# Patient Record
Sex: MALE | Race: WHITE | NOT HISPANIC OR LATINO | Employment: FULL TIME | URBAN - METROPOLITAN AREA
[De-identification: names, ages, dates, MRNs, and addresses within clinical notes are randomized per-mention and may not be internally consistent; named-entity substitution may affect disease eponyms.]

---

## 2024-02-15 DIAGNOSIS — L02.91 ABSCESS: Primary | ICD-10-CM

## 2024-02-15 RX ORDER — SULFAMETHOXAZOLE AND TRIMETHOPRIM 800; 160 MG/1; MG/1
1 TABLET ORAL 2 TIMES DAILY
Qty: 10 TABLET | Refills: 0 | Status: SHIPPED | OUTPATIENT
Start: 2024-02-15 | End: 2024-02-20

## 2024-02-15 NOTE — PROGRESS NOTES
Right axillary inflammation with small abscess noted. Plan for bactrim x 5 days. May need further drainage or excision if this persists

## 2025-02-12 ENCOUNTER — OFFICE VISIT (OUTPATIENT)
Dept: PAIN MEDICINE | Facility: CLINIC | Age: 40
End: 2025-02-12
Payer: COMMERCIAL

## 2025-02-12 VITALS — DIASTOLIC BLOOD PRESSURE: 64 MMHG | SYSTOLIC BLOOD PRESSURE: 96 MMHG | OXYGEN SATURATION: 95 % | HEART RATE: 77 BPM

## 2025-02-12 DIAGNOSIS — M46.1 SACROILIITIS, NOT ELSEWHERE CLASSIFIED (CMS-HCC): Primary | ICD-10-CM

## 2025-02-12 PROCEDURE — 99214 OFFICE O/P EST MOD 30 MIN: CPT | Performed by: ANESTHESIOLOGY

## 2025-02-12 PROCEDURE — 99204 OFFICE O/P NEW MOD 45 MIN: CPT | Performed by: ANESTHESIOLOGY

## 2025-02-12 PROCEDURE — G2211 COMPLEX E/M VISIT ADD ON: HCPCS | Performed by: ANESTHESIOLOGY

## 2025-02-12 ASSESSMENT — ENCOUNTER SYMPTOMS
LOSS OF SENSATION IN FEET: 0
DEPRESSION: 0
ENDOCRINE NEGATIVE: 1
CONSTITUTIONAL NEGATIVE: 1
BACK PAIN: 1
RESPIRATORY NEGATIVE: 1
OCCASIONAL FEELINGS OF UNSTEADINESS: 0
NEUROLOGICAL NEGATIVE: 1
CARDIOVASCULAR NEGATIVE: 1
EYES NEGATIVE: 1
GASTROINTESTINAL NEGATIVE: 1
HEMATOLOGIC/LYMPHATIC NEGATIVE: 1
PSYCHIATRIC NEGATIVE: 1

## 2025-02-12 ASSESSMENT — PAIN DESCRIPTION - DESCRIPTORS: DESCRIPTORS: SHARP

## 2025-02-12 ASSESSMENT — LIFESTYLE VARIABLES: TOTAL SCORE: 1

## 2025-02-12 ASSESSMENT — PATIENT HEALTH QUESTIONNAIRE - PHQ9
2. FEELING DOWN, DEPRESSED OR HOPELESS: NOT AT ALL
SUM OF ALL RESPONSES TO PHQ9 QUESTIONS 1 AND 2: 0
1. LITTLE INTEREST OR PLEASURE IN DOING THINGS: NOT AT ALL

## 2025-02-12 ASSESSMENT — PAIN - FUNCTIONAL ASSESSMENT: PAIN_FUNCTIONAL_ASSESSMENT: 0-10

## 2025-02-12 ASSESSMENT — PAIN SCALES - GENERAL
PAINLEVEL_OUTOF10: 7
PAINLEVEL_OUTOF10: 7

## 2025-02-12 NOTE — PROGRESS NOTES
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    Pt describes L buttock pain since September without inciting trauma/incident. Pain does not radiate. Pain is worse with sitting, but also noticeable when laying on firm mattress. Pt has tried avoiding sitting on hard surfaces, but has not experienced sustained relief.     Pt denies new-onset numbness, weakness, bowel/bladder incontinence.  Pt denies recent infection, allergy to Latex/iodine/contrast. Patient is currently taking the following blood thinner(s): N/A    REVIEW OF SYSTEMS  Review of Systems   Constitutional: Negative.    HENT: Negative.     Eyes: Negative.    Respiratory: Negative.     Cardiovascular: Negative.    Gastrointestinal: Negative.    Endocrine: Negative.    Musculoskeletal:  Positive for back pain.   Skin: Negative.    Neurological: Negative.    Hematological: Negative.    Psychiatric/Behavioral: Negative.         PAST MEDICAL HISTORY  History reviewed. No pertinent past medical history.  History reviewed. No pertinent surgical history.  No family history on file.    CURRENT MEDICATIONS  No current outpatient medications on file.     No current facility-administered medications for this visit.       ALLERGIES AND DRUG REACTIONS  No Known Allergies       OBJECTIVE  Visit Vitals  BP 96/64   Pulse 77   SpO2 95%   Smoking Status Never       Last Recorded Pain Score (if available):           Pain Score:   7     Physical Exam  Vitals and nursing note reviewed.     General: Sitting in chair, NAD  Head: NCAT  Eyes: Sclera/conjunctiva clear, EOMI, PERRL  Nose/mouth: MMM  CV: Good distal pulses  Lungs: Good/equal chest excursion  Abdomen: Soft, ND  Ext: No cyanosis/edema  MSK: L-spine alignment: unremarkable, BL  "paraspinal m NTTP except for left lower sacrum, L-spine ROM: full  L SIJ: +thigh thrust, [place], CHRISTY, Carla finger    Neuro: AAOx3, CN grossly nl, sensation grossly nl    Psych: affect nl  Skin: no rash/lesions      REVIEW OF LABORATORY DATA  I have reviewed the following lab results:  WBC   Date Value Ref Range Status   09/21/2019 9.8 4.5 - 11.0 K/UL Final     RBC   Date Value Ref Range Status   09/21/2019 4.40 (L) 4.5 - 5.5 M/UL Final     Hemoglobin   Date Value Ref Range Status   09/21/2019 13.2 (L) 13.5 - 16.5 GM/DL Final     Hematocrit   Date Value Ref Range Status   09/21/2019 39.3 (L) 41 - 50 % Final     MCV   Date Value Ref Range Status   09/21/2019 89.3 80 - 100 FL Final     MCH   Date Value Ref Range Status   09/21/2019 30.0 26 - 34 PG Final     MCHC   Date Value Ref Range Status   09/21/2019 33.6 31 - 37 % Final     Platelets   Date Value Ref Range Status   09/21/2019 471 (H) 150 - 450 K/UL Final     MPV   Date Value Ref Range Status   09/21/2019 9.3 7.0 - 12.6 CU Final     Sodium   Date Value Ref Range Status   09/21/2019 137 133 - 145 MMOL/L Final     Potassium   Date Value Ref Range Status   09/21/2019 4.0 3.4 - 5.1 MMOL/L Final     Bicarbonate   Date Value Ref Range Status   09/21/2019 25 24 - 31 MMOL/L Final     Urea Nitrogen   Date Value Ref Range Status   09/21/2019 13 8 - 25 MG/DL Final     Calcium   Date Value Ref Range Status   09/21/2019 9.6 8.5 - 10.4 MG/DL Final     No results found for: \"PROTIME\", \"PTT\", \"INR\", \"FIBRINOGEN\"      REVIEW OF RADIOLOGY   I have reviewed the following:  Radiology Studies           MR abdomen w/wo 12/26/25:  Stable study with no recurrence or abdominopelvic metastasis.       MR pelvis w/wo  12/26/25:   Stable study with no recurrence or abdominopelvic metastasis.       XR sacrum/coccyx 9/5/24:  1.  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    1) L buttock pain  -Likely SIJ-mediated as it is reproducible on multiple SIJ-provocative maneuvers on PE  -Refractive to >4 mo conservative tx including lifestyle/ergonomic modifications, rest  -Reviewed/discussed XR sacrum/coccyx 9/5/24: Mild sacroiliac joint osteoarthritis   -Reviewed/discussed MR pelvis w/wo  12/26/25:  Stable study with no recurrence or abdominopelvic metastasis.  -Reviewed/discussed MR abdomen w/wo 12/26/25: Stable study with no recurrence or abdominopelvic metastasis.   -Schedule L SIJ CSI to target pain generator as seen on imaging and minimize risk/likelihood of chronic opioid use and/or surgery  -In meantime, maximize conservative tx with referral to PT   -Consider ganglion impar block if refractive  -Consider MR sacrum/pelvis          Discussed procedure risks/benefits in detail with patient. Pt meets medical necessity for procedure due to failure of conservative measures. Reviewed procedural risks including bleeding, infection, nerve damage, paralysis. Also reviewed mitigating factors such as screening for infection/blood thinner use, sterile precautions, and image-guidance when applicable. All questions answered. Pt/guardian expressed understanding and choose to proceed    Today's visit involved establishment of care and a complete examination with review of records. In the context of the complexity of this patient's chronic pain diagnosis, long-term expectations and care planning discussed. Imaging studies ordered are placed do elucidate the patient's diagnosis, but also to evaluate the patient's candidacy for procedural and surgical interventions. The risks and benefits of these potential interventions are detailed as above.         Audrey Carlos MD  Anesthesiologist & Interventional Pain Physician   [facility] [place]  O: [phone]  F: [phone]  4:11 PM  02/12/25

## 2025-02-12 NOTE — H&P (VIEW-ONLY)
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    Pt describes L buttock pain since September without inciting trauma/incident. Pain does not radiate. Pain is worse with sitting, but also noticeable when laying on firm mattress. Pt has tried avoiding sitting on hard surfaces, but has not experienced sustained relief.     Pt denies new-onset numbness, weakness, bowel/bladder incontinence.  Pt denies recent infection, allergy to Latex/iodine/contrast. Patient is currently taking the following blood thinner(s): N/A    REVIEW OF SYSTEMS  Review of Systems   Constitutional: Negative.    HENT: Negative.     Eyes: Negative.    Respiratory: Negative.     Cardiovascular: Negative.    Gastrointestinal: Negative.    Endocrine: Negative.    Musculoskeletal:  Positive for back pain.   Skin: Negative.    Neurological: Negative.    Hematological: Negative.    Psychiatric/Behavioral: Negative.         PAST MEDICAL HISTORY  History reviewed. No pertinent past medical history.  History reviewed. No pertinent surgical history.  No family history on file.    CURRENT MEDICATIONS  No current outpatient medications on file.     No current facility-administered medications for this visit.       ALLERGIES AND DRUG REACTIONS  No Known Allergies       OBJECTIVE  Visit Vitals  BP 96/64   Pulse 77   SpO2 95%   Smoking Status Never       Last Recorded Pain Score (if available):           Pain Score:   7     Physical Exam  Vitals and nursing note reviewed.     General: Sitting in chair, NAD  Head: NCAT  Eyes: Sclera/conjunctiva clear, EOMI, PERRL  Nose/mouth: MMM  CV: Good distal pulses  Lungs: Good/equal chest excursion  Abdomen: Soft, ND  Ext: No cyanosis/edema  MSK: L-spine alignment: unremarkable, BL  "paraspinal m NTTP except for left lower sacrum, L-spine ROM: full  L SIJ: +thigh thrust, [place]len, CHRISTY, Carla finger    Neuro: AAOx3, CN grossly nl, sensation grossly nl    Psych: affect nl  Skin: no rash/lesions      REVIEW OF LABORATORY DATA  I have reviewed the following lab results:  WBC   Date Value Ref Range Status   09/21/2019 9.8 4.5 - 11.0 K/UL Final     RBC   Date Value Ref Range Status   09/21/2019 4.40 (L) 4.5 - 5.5 M/UL Final     Hemoglobin   Date Value Ref Range Status   09/21/2019 13.2 (L) 13.5 - 16.5 GM/DL Final     Hematocrit   Date Value Ref Range Status   09/21/2019 39.3 (L) 41 - 50 % Final     MCV   Date Value Ref Range Status   09/21/2019 89.3 80 - 100 FL Final     MCH   Date Value Ref Range Status   09/21/2019 30.0 26 - 34 PG Final     MCHC   Date Value Ref Range Status   09/21/2019 33.6 31 - 37 % Final     Platelets   Date Value Ref Range Status   09/21/2019 471 (H) 150 - 450 K/UL Final     MPV   Date Value Ref Range Status   09/21/2019 9.3 7.0 - 12.6 CU Final     Sodium   Date Value Ref Range Status   09/21/2019 137 133 - 145 MMOL/L Final     Potassium   Date Value Ref Range Status   09/21/2019 4.0 3.4 - 5.1 MMOL/L Final     Bicarbonate   Date Value Ref Range Status   09/21/2019 25 24 - 31 MMOL/L Final     Urea Nitrogen   Date Value Ref Range Status   09/21/2019 13 8 - 25 MG/DL Final     Calcium   Date Value Ref Range Status   09/21/2019 9.6 8.5 - 10.4 MG/DL Final     No results found for: \"PROTIME\", \"PTT\", \"INR\", \"FIBRINOGEN\"      REVIEW OF RADIOLOGY   I have reviewed the following:  Radiology Studies           MR abdomen w/wo 12/26/25:  Stable study with no recurrence or abdominopelvic metastasis.       MR pelvis w/wo  12/26/25:   Stable study with no recurrence or abdominopelvic metastasis.       XR sacrum/coccyx 9/5/24:  1.  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    1) L buttock pain  -Likely SIJ-mediated as it is reproducible on multiple SIJ-provocative maneuvers on PE  -Refractive to >4 mo conservative tx including lifestyle/ergonomic modifications, rest  -Reviewed/discussed XR sacrum/coccyx 9/5/24: Mild sacroiliac joint osteoarthritis   -Reviewed/discussed MR pelvis w/wo  12/26/25:  Stable study with no recurrence or abdominopelvic metastasis.  -Reviewed/discussed MR abdomen w/wo 12/26/25: Stable study with no recurrence or abdominopelvic metastasis.   -Schedule L SIJ CSI to target pain generator as seen on imaging and minimize risk/likelihood of chronic opioid use and/or surgery  -In meantime, maximize conservative tx with referral to PT   -Consider ganglion impar block if refractive  -Consider MR sacrum/pelvis          Discussed procedure risks/benefits in detail with patient. Pt meets medical necessity for procedure due to failure of conservative measures. Reviewed procedural risks including bleeding, infection, nerve damage, paralysis. Also reviewed mitigating factors such as screening for infection/blood thinner use, sterile precautions, and image-guidance when applicable. All questions answered. Pt/guardian expressed understanding and choose to proceed    Today's visit involved establishment of care and a complete examination with review of records. In the context of the complexity of this patient's chronic pain diagnosis, long-term expectations and care planning discussed. Imaging studies ordered are placed do elucidate the patient's diagnosis, but also to evaluate the patient's candidacy for procedural and surgical interventions. The risks and benefits of these potential interventions are detailed as above.         Audrey Carlos MD  Anesthesiologist & Interventional Pain Physician   [facility] [place]  O: [phone]  F: [phone]  4:11 PM  02/12/25

## 2025-03-06 ENCOUNTER — HOSPITAL ENCOUNTER (OUTPATIENT)
Dept: GASTROENTEROLOGY | Facility: HOSPITAL | Age: 40
Discharge: HOME | End: 2025-03-06
Payer: COMMERCIAL

## 2025-03-06 VITALS
OXYGEN SATURATION: 100 % | HEIGHT: 71 IN | RESPIRATION RATE: 17 BRPM | TEMPERATURE: 97.3 F | DIASTOLIC BLOOD PRESSURE: 76 MMHG | BODY MASS INDEX: 28 KG/M2 | HEART RATE: 71 BPM | WEIGHT: 200 LBS | SYSTOLIC BLOOD PRESSURE: 122 MMHG

## 2025-03-06 DIAGNOSIS — M46.1 SACROILIITIS, NOT ELSEWHERE CLASSIFIED (CMS-HCC): ICD-10-CM

## 2025-03-06 PROCEDURE — 2500000004 HC RX 250 GENERAL PHARMACY W/ HCPCS (ALT 636 FOR OP/ED): Mod: JZ | Performed by: ANESTHESIOLOGY

## 2025-03-06 PROCEDURE — 27096 INJECT SACROILIAC JOINT: CPT | Performed by: ANESTHESIOLOGY

## 2025-03-06 RX ORDER — LIDOCAINE HYDROCHLORIDE 10 MG/ML
INJECTION, SOLUTION EPIDURAL; INFILTRATION; INTRACAUDAL; PERINEURAL AS NEEDED
Status: COMPLETED | OUTPATIENT
Start: 2025-03-06 | End: 2025-03-06

## 2025-03-06 RX ORDER — BUPIVACAINE HYDROCHLORIDE 5 MG/ML
INJECTION, SOLUTION PERINEURAL AS NEEDED
Status: COMPLETED | OUTPATIENT
Start: 2025-03-06 | End: 2025-03-06

## 2025-03-06 RX ORDER — METHYLPREDNISOLONE ACETATE 40 MG/ML
INJECTION, SUSPENSION INTRA-ARTICULAR; INTRALESIONAL; INTRAMUSCULAR; SOFT TISSUE AS NEEDED
Status: COMPLETED | OUTPATIENT
Start: 2025-03-06 | End: 2025-03-06

## 2025-03-06 RX ORDER — DEXAMETHASONE 2 MG/1
TABLET ORAL
COMMUNITY
Start: 2025-03-04

## 2025-03-06 RX ADMIN — BUPIVACAINE HYDROCHLORIDE 2 ML: 5 INJECTION, SOLUTION PERINEURAL at 15:03

## 2025-03-06 RX ADMIN — LIDOCAINE HYDROCHLORIDE 3 ML: 10 INJECTION, SOLUTION EPIDURAL; INFILTRATION; INTRACAUDAL; PERINEURAL at 15:03

## 2025-03-06 RX ADMIN — METHYLPREDNISOLONE ACETATE 40 MG: 40 INJECTION, SUSPENSION INTRA-ARTICULAR; INTRALESIONAL; INTRAMUSCULAR; SOFT TISSUE at 15:03

## 2025-03-06 ASSESSMENT — PAIN - FUNCTIONAL ASSESSMENT
PAIN_FUNCTIONAL_ASSESSMENT: 0-10
PAIN_FUNCTIONAL_ASSESSMENT: 0-10

## 2025-03-06 ASSESSMENT — PAIN SCALES - GENERAL
PAINLEVEL_OUTOF10: 0 - NO PAIN
PAINLEVEL_OUTOF10: 6

## 2025-03-06 ASSESSMENT — PAIN DESCRIPTION - DESCRIPTORS: DESCRIPTORS: SHARP

## 2025-03-06 NOTE — DISCHARGE INSTRUCTIONS
DISCHARGE INSTRUCTIONS FOR INJECTIONS     You underwent a left sacroiliac joint injection today    Aftermost injections, it is recommended that you relax and limit your activity for the remainder of the day unless you have been told otherwise by your pain physician.  You should not drive a car, operate machinery, or make important legal decisions unless otherwise directed by your pain physician.  You may resume your normal activity, including exercise, tomorrow.      Keep a written pain diary of how much pain relief you experienced following the injection procedure and the length of time of pain relief you experienced pain relief. Following diagnostic injections like medial branch nerve blocks, sacroiliac joint blocks, stellate ganglion injections and other blocks, it is very important you record the specific amount of pain relief you experienced immediately after the injectionand how long it lasted. Your doctor will ask you for this information at your follow up visit.     For all injections, please keep the injection site dry and inspect the site for a couple of days. You may remove the Band-Aid the day of the injection at any time.     Some discomfort, bruising or slight swelling may occur at the injection site. This is not abnormal if it occurs.  If needed you may:    -Take over the counter medication such as Tylenol or Motrin.   -Apply an ice pack for 30 minutes, 2 to 3 times a day for the first 24 hours.     You may shower today; no soaking baths, hot tubs, whirlpools or swimming pools for two days.      If you are given steroids in your injection, it may take 3-5 days for the steroid medication to take effect. You may notice a worsening of your symptoms for 1-2 days after the injection. This is not abnormal.  You may use acetaminophen, ibuprofen, or prescription medication that your doctor may have prescribed for you if you need to do so.     A few common side effects of steroids include facial flushing,  sweating, restlessness, irritability,difficulty sleeping, increase in blood sugar, and increased blood pressure. If you have diabetes, please monitor your blood sugar at least once a day for at least 5 days. If you have poorly controlled high blood pressure, monitoryour blood pressure for at least 2 days and contact your primary care physician if these numbers are unusually high for you.      If you take aspirin or non-steroidal anti-inflammatory drugs (examples are Motrin, Advil, ibuprofen, Naprosyn, Voltaren, Relafen, etc.) you may restart these this evening, but stop taking it 3 days before your next appointment, unless instructed otherwiseby your physician.      You do not need to discontinue non-aspirin-containing pain medications prior to an injection (examples: Celebrex, tramadol, hydrocodone and acetaminophen).      If you take a blood thinning medication (Coumadin, Lovenox, Fragmin,Ticlid, Plavix, Pradaxa, etc.), please discuss this with your primary care physician/cardiologist and your pain physician. These medications MUST be discontinued before you can have an injection safely, without the risk of uncontrolled bleeding. If these medications are not discontinued for an appropriate period of time, you will not be able to receivean injection.      If you are taking Coumadin, please have your INR checked the morning of your procedure and bringthe result to your appointment unless otherwise instructed. If your INR is over 1.2, your injection may need to be rescheduled to avoid uncontrolled bleeding from the needle placement.     Call [facility] at [phone] between 8am-4pm Monday - Friday if you are experiencing the following:    If you received an epidural or spinal injection:    -Headache that doesnot go away with medicine, is worse when sitting or standing up, and is greatly relieved upon lying down.   -Severe pain worse than or different than your baseline pain.   -Chills or fever  (101º F or greater).   -Drainage or signs of infection at the injection site     Go directly to the Emergency Department if you are experiencing the following and received an epidural or spinal injection:   -Abrupt weakness or progressive weakness in your legs that starts after you leave the clinic.   -Abrupt severe or worsening numbness in your legs.   -Inability to urinate after the injection or loss of bowel or bladder control without the urge to defecate or urinate.     If you have a clinical question that cannot wait until your next appointment, please call [phone] between 8am-4pm Monday - Friday or send a [employer] message. We do our best to return all non-emergency messages within 24 hours, Monday - Friday. A nurse or physician will return your message.      If you need to cancel an appointment, please call the scheduling staff at [phone] during normal business hours or leave a message at least 24 hours in advance.     If you are going to be sedated for your next procedure, you MUST have responsible adult who can legally drive accompany you home. You cannot eat or drink for eight hours prior to the planned procedure if you are going to receive sedation. You may take your non-blood thinning medications with a small sip of water.

## 2025-03-06 NOTE — PERIOPERATIVE NURSING NOTE
Patient alert and awake.   Band aid dry and intact x 1.  Discharge instructions reviewed with patient. Patient ambulates without difficulty, pt denies weakness of lower extremities

## 2025-03-06 NOTE — INTERVAL H&P NOTE
H&P reviewed. The patient was examined and there are no changes to the H&P. 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 Patient's pain stable and persistent from last visit.  Denies allergies to Latex, steroids, local anesthetics, or iodine/contrast. Denies being on blood thinners. Not diabetic.  Denies fever, chills, NS, CP, SOB, cough, N/V.    Discussed procedure risks/benefits in detail with patient. Pt meets medical necessity for procedure due to failure of conservative measures. Reviewed procedural risks including bleeding, infection, nerve damage, paralysis. Also reviewed mitigating factors such as screening for infection/blood thinner use, sterile precautions, and image-guidance when applicable. All questions answered. Pt/guardian expressed understanding and choose to proceed      Audrey Carlos MD  Anesthesiologist & Interventional Pain Physician   [facility] [place]  O: [phone]  F: [phone]  2:30 PM  03/06/25